# Patient Record
Sex: FEMALE | URBAN - METROPOLITAN AREA
[De-identification: names, ages, dates, MRNs, and addresses within clinical notes are randomized per-mention and may not be internally consistent; named-entity substitution may affect disease eponyms.]

---

## 2024-03-11 ENCOUNTER — OFFICE VISIT (OUTPATIENT)
Dept: FAMILY MEDICINE CLINIC | Facility: CLINIC | Age: 39
End: 2024-03-11

## 2024-03-11 VITALS
TEMPERATURE: 98.3 F | HEART RATE: 89 BPM | WEIGHT: 293 LBS | BODY MASS INDEX: 45.99 KG/M2 | OXYGEN SATURATION: 100 % | RESPIRATION RATE: 18 BRPM | HEIGHT: 67 IN | SYSTOLIC BLOOD PRESSURE: 138 MMHG | DIASTOLIC BLOOD PRESSURE: 86 MMHG

## 2024-03-11 DIAGNOSIS — M51.36 L4-L5 DISC BULGE: ICD-10-CM

## 2024-03-11 DIAGNOSIS — M54.42 CHRONIC LEFT-SIDED LOW BACK PAIN WITH LEFT-SIDED SCIATICA: ICD-10-CM

## 2024-03-11 DIAGNOSIS — G89.29 CHRONIC LEFT-SIDED LOW BACK PAIN WITH LEFT-SIDED SCIATICA: ICD-10-CM

## 2024-03-11 DIAGNOSIS — Z00.00 ENCOUNTER FOR WELL ADULT EXAM WITHOUT ABNORMAL FINDINGS: Primary | ICD-10-CM

## 2024-03-11 DIAGNOSIS — R03.0 ELEVATED BLOOD-PRESSURE READING WITHOUT DIAGNOSIS OF HYPERTENSION: ICD-10-CM

## 2024-03-11 DIAGNOSIS — E66.01 CLASS 3 SEVERE OBESITY DUE TO EXCESS CALORIES WITHOUT SERIOUS COMORBIDITY WITH BODY MASS INDEX (BMI) OF 50.0 TO 59.9 IN ADULT (HCC): ICD-10-CM

## 2024-03-11 DIAGNOSIS — Z12.31 ENCOUNTER FOR SCREENING MAMMOGRAM FOR BREAST CANCER: ICD-10-CM

## 2024-03-11 PROBLEM — E66.813 CLASS 3 SEVERE OBESITY DUE TO EXCESS CALORIES WITHOUT SERIOUS COMORBIDITY WITH BODY MASS INDEX (BMI) OF 50.0 TO 59.9 IN ADULT: Status: ACTIVE | Noted: 2024-03-11

## 2024-03-11 PROBLEM — M51.369 L4-L5 DISC BULGE: Status: ACTIVE | Noted: 2024-03-11

## 2024-03-11 RX ORDER — NAPROXEN 500 MG/1
500 TABLET ORAL AS NEEDED
COMMUNITY

## 2024-03-11 RX ORDER — PREGABALIN 75 MG/1
75 CAPSULE ORAL 2 TIMES DAILY
Qty: 60 CAPSULE | Refills: 3 | Status: SHIPPED | OUTPATIENT
Start: 2024-03-11 | End: 2024-07-09

## 2024-03-11 RX ORDER — ACETAMINOPHEN 500 MG
1000 TABLET ORAL 2 TIMES DAILY
Qty: 120 TABLET | Refills: 0 | Status: SHIPPED | OUTPATIENT
Start: 2024-03-11

## 2024-03-11 SDOH — ECONOMIC STABILITY: HOUSING INSECURITY
IN THE LAST 12 MONTHS, WAS THERE A TIME WHEN YOU DID NOT HAVE A STEADY PLACE TO SLEEP OR SLEPT IN A SHELTER (INCLUDING NOW)?: NO

## 2024-03-11 SDOH — ECONOMIC STABILITY: FOOD INSECURITY: WITHIN THE PAST 12 MONTHS, THE FOOD YOU BOUGHT JUST DIDN'T LAST AND YOU DIDN'T HAVE MONEY TO GET MORE.: NEVER TRUE

## 2024-03-11 SDOH — ECONOMIC STABILITY: FOOD INSECURITY: WITHIN THE PAST 12 MONTHS, YOU WORRIED THAT YOUR FOOD WOULD RUN OUT BEFORE YOU GOT MONEY TO BUY MORE.: NEVER TRUE

## 2024-03-11 SDOH — ECONOMIC STABILITY: INCOME INSECURITY: HOW HARD IS IT FOR YOU TO PAY FOR THE VERY BASICS LIKE FOOD, HOUSING, MEDICAL CARE, AND HEATING?: NOT HARD AT ALL

## 2024-03-11 ASSESSMENT — ENCOUNTER SYMPTOMS
DIARRHEA: 0
ABDOMINAL PAIN: 0
RHINORRHEA: 0
CONSTIPATION: 0
SHORTNESS OF BREATH: 0
EYE PAIN: 0
BACK PAIN: 0
VOMITING: 0
CHEST TIGHTNESS: 0
SORE THROAT: 0
COUGH: 0
NAUSEA: 0

## 2024-03-11 ASSESSMENT — PATIENT HEALTH QUESTIONNAIRE - PHQ9
1. LITTLE INTEREST OR PLEASURE IN DOING THINGS: 0
SUM OF ALL RESPONSES TO PHQ QUESTIONS 1-9: 0
SUM OF ALL RESPONSES TO PHQ9 QUESTIONS 1 & 2: 0
SUM OF ALL RESPONSES TO PHQ QUESTIONS 1-9: 0
2. FEELING DOWN, DEPRESSED OR HOPELESS: 0
SUM OF ALL RESPONSES TO PHQ QUESTIONS 1-9: 0
SUM OF ALL RESPONSES TO PHQ QUESTIONS 1-9: 0

## 2024-03-11 NOTE — PROGRESS NOTES
Bhavik Kumar is a 38 y.o. female (: 1985) presenting to address:    Chief Complaint   Patient presents with    New Patient     C/o back pain       Vitals:    24 1302   BP: 138/86   Pulse: 89   Resp: 18   Temp: 98.3 °F (36.8 °C)   SpO2: 100%       \"Have you been to the ER, urgent care clinic since your last visit?  Hospitalized since your last visit?\"    YES - When: approximately 3 months ago.  Where and Why: [facility] ER for back pain.    “Have you seen or consulted any other health care providers outside of [facility] since your last visit?”    NO        “Have you had a pap smear?”    YES - Where: [facility]  Nurse/CMA to request most recent records if not in the chart          
on blood pressure medicines per patient.  Normal today.  Continue to monitor.    #HM -mammogram ordered.  Pap smear up-to-date.    Plan of care has been discussed, patient agrees with plan; all questions answered.   More than 50% of the time spent in this visit was counseling the patient about  illness and treatment options       Follow up in 1 month for lyrica/ back pain.      Maurice Juarez, DO  Family Medicine  [facility]     PLEASE NOTE:   This document has been produced using voice recognition software. Unrecognized errors in transcription may be present.

## 2024-03-14 ENCOUNTER — NURSE ONLY (OUTPATIENT)
Dept: FAMILY MEDICINE CLINIC | Facility: CLINIC | Age: 39
End: 2024-03-14

## 2024-03-14 DIAGNOSIS — Z00.00 ENCOUNTER FOR WELL ADULT EXAM WITHOUT ABNORMAL FINDINGS: ICD-10-CM

## 2024-03-14 DIAGNOSIS — E66.01 CLASS 3 SEVERE OBESITY DUE TO EXCESS CALORIES WITHOUT SERIOUS COMORBIDITY WITH BODY MASS INDEX (BMI) OF 50.0 TO 59.9 IN ADULT (HCC): ICD-10-CM

## 2024-03-15 DIAGNOSIS — D50.0 IRON DEFICIENCY ANEMIA DUE TO CHRONIC BLOOD LOSS: Primary | ICD-10-CM

## 2024-03-15 LAB
A/G RATIO: 1.3 RATIO (ref 1.1–2.6)
ALBUMIN SERPL-MCNC: 4 G/DL (ref 3.5–5)
ALP BLD-CCNC: 83 U/L (ref 25–115)
ALT SERPL-CCNC: 14 U/L (ref 5–40)
ANION GAP SERPL CALCULATED.3IONS-SCNC: 7 MMOL/L (ref 3–15)
AST SERPL-CCNC: 13 U/L (ref 10–37)
BASOPHILS # BLD: 1 % (ref 0–2)
BASOPHILS ABSOLUTE: 0.1 K/UL (ref 0–0.2)
BILIRUB SERPL-MCNC: 0.3 MG/DL (ref 0.2–1.2)
BUN BLDV-MCNC: 13 MG/DL (ref 6–22)
CALCIUM SERPL-MCNC: 8.9 MG/DL (ref 8.4–10.5)
CHLORIDE BLD-SCNC: 106 MMOL/L (ref 98–110)
CHOLESTEROL/HDL RATIO: 3.4 (ref 0–5)
CHOLESTEROL: 193 MG/DL (ref 110–200)
CO2: 26 MMOL/L (ref 20–32)
CREAT SERPL-MCNC: 0.6 MG/DL (ref 0.5–1.2)
EOSINOPHIL # BLD: 2 % (ref 0–6)
EOSINOPHILS ABSOLUTE: 0.2 K/UL (ref 0–0.5)
ESTIMATED AVERAGE GLUCOSE: 121 MG/DL (ref 91–123)
GLOBULIN: 3 G/DL (ref 2–4)
GLOMERULAR FILTRATION RATE: >60 ML/MIN/1.73 SQ.M.
GLUCOSE: 117 MG/DL (ref 70–99)
HBA1C MFR BLD: 5.8 % (ref 4.8–5.6)
HCT VFR BLD CALC: 32.4 % (ref 35.1–46.5)
HDLC SERPL-MCNC: 57 MG/DL
HEMOGLOBIN: 9.5 G/DL (ref 11.7–15.5)
LDL CHOLESTEROL CALCULATED: 116 MG/DL (ref 50–99)
LDL/HDL RATIO: 2
LYMPHOCYTES # BLD: 35 % (ref 20–45)
LYMPHOCYTES ABSOLUTE: 3.8 K/UL (ref 1–4.8)
MCH RBC QN AUTO: 23 PG (ref 26–34)
MCHC RBC AUTO-ENTMCNC: 29 G/DL (ref 31–36)
MCV RBC AUTO: 78 FL (ref 80–99)
MONOCYTES ABSOLUTE: 0.6 K/UL (ref 0.1–1)
MONOCYTES: 6 % (ref 3–12)
NEUTROPHILS ABSOLUTE: 6 K/UL (ref 1.8–7.7)
NEUTROPHILS: 56 % (ref 40–75)
NON-HDL CHOLESTEROL: 136 MG/DL
PDW BLD-RTO: 18 % (ref 10–15.5)
PLATELET # BLD: 406 K/UL (ref 140–440)
PMV BLD AUTO: 11.1 FL (ref 9–13)
POTASSIUM SERPL-SCNC: 4.4 MMOL/L (ref 3.5–5.5)
RBC: 4.17 M/UL (ref 3.8–5.2)
SODIUM BLD-SCNC: 139 MMOL/L (ref 133–145)
TOTAL PROTEIN: 7 G/DL (ref 6.4–8.3)
TRIGL SERPL-MCNC: 100 MG/DL (ref 40–149)
VLDLC SERPL CALC-MCNC: 20 MG/DL (ref 8–30)
WBC: 10.8 K/UL (ref 4–11)

## 2024-03-15 RX ORDER — FERROUS SULFATE 325(65) MG
325 TABLET ORAL
Qty: 90 TABLET | Refills: 1 | Status: SHIPPED | OUTPATIENT
Start: 2024-03-15

## 2024-04-09 ASSESSMENT — ENCOUNTER SYMPTOMS
SORE THROAT: 0
ABDOMINAL PAIN: 0
EYE PAIN: 0
COUGH: 0
RHINORRHEA: 0
SHORTNESS OF BREATH: 0
CONSTIPATION: 0
BACK PAIN: 0
NAUSEA: 0
VOMITING: 0
DIARRHEA: 0
CHEST TIGHTNESS: 0

## 2024-04-09 NOTE — PROGRESS NOTES
[facility]      MR#: [id number]    HISTORY OF PRESENT ILLNESS  Bhavik Kumar  is a 38 y.o.  female who presents for back pain follow-up.  Had MRI completed.  Has not started physical therapy yet.  Pain clinic was unable to help her, needs a new referral.  Started Lyrica which has been helpful but causes her to be too sleepy and sedated, trying a different medication.    Periods are heavy, previously told she had PCOS, previously was on OCPs but she had more frequent bleeding and so he stopped it.  Has 1 child and is not trying to become pregnant again.  Interested in other options to control her      Past medical history:  Hypertension?  Chronic lower back pain with sciatica  Iron deficiency anemia  Heavy menstrual cycle    Social:  Tobacco use: Never  Alcohol use:  Denies  Illicit drug use: Denies  Housing: Lives in [place]  Employment: [employer]    Health maintenance:  Colon cancer screening: At 43  Breast cancer screening: Due, ordered  Cervical cancer screening: NILM in 2023  COVID: Due   Influenza: Due   PCV: At 63  Shingles: At 48      No family history on file.    No Known Allergies    Social History     Tobacco Use   Smoking Status Never   Smokeless Tobacco Never       Social History     Substance and Sexual Activity   Alcohol Use Not Currently         There is no immunization history on file for this patient.      Current Outpatient Medications:     pregabalin (LYRICA) 50 MG capsule, Take 1 capsule by mouth 2 times daily for 120 days. Max Daily Amount: 100 mg, Disp: 60 capsule, Rfl: 3    naproxen (NAPROSYN) 500 MG tablet, Take 1 tablet by mouth 2 times daily (with meals), Disp: 60 tablet, Rfl: 3    tiZANidine (ZANAFLEX) 2 MG tablet, Take 1 tablet by mouth every 8 hours as needed (muscle spasm, back pain), Disp: 30 tablet, Rfl: 0    ferrous sulfate (IRON 325) 325 (65 Fe) MG tablet, Take 1 tablet by mouth daily (with breakfast), Disp: 90 tablet, Rfl: 1    naproxen (NAPROSYN) 500

## 2024-04-10 ENCOUNTER — NURSE ONLY (OUTPATIENT)
Dept: FAMILY MEDICINE CLINIC | Facility: CLINIC | Age: 39
End: 2024-04-10

## 2024-04-10 ENCOUNTER — OFFICE VISIT (OUTPATIENT)
Dept: FAMILY MEDICINE CLINIC | Facility: CLINIC | Age: 39
End: 2024-04-10
Payer: MEDICAID

## 2024-04-10 VITALS
BODY MASS INDEX: 45.99 KG/M2 | SYSTOLIC BLOOD PRESSURE: 132 MMHG | DIASTOLIC BLOOD PRESSURE: 80 MMHG | HEART RATE: 84 BPM | OXYGEN SATURATION: 98 % | RESPIRATION RATE: 18 BRPM | WEIGHT: 293 LBS | TEMPERATURE: 98.2 F | HEIGHT: 67 IN

## 2024-04-10 DIAGNOSIS — D50.0 IRON DEFICIENCY ANEMIA DUE TO CHRONIC BLOOD LOSS: ICD-10-CM

## 2024-04-10 DIAGNOSIS — G89.29 CHRONIC LEFT-SIDED LOW BACK PAIN WITH LEFT-SIDED SCIATICA: Primary | ICD-10-CM

## 2024-04-10 DIAGNOSIS — N92.0 MENORRHAGIA WITH REGULAR CYCLE: ICD-10-CM

## 2024-04-10 DIAGNOSIS — M54.42 CHRONIC LEFT-SIDED LOW BACK PAIN WITH LEFT-SIDED SCIATICA: Primary | ICD-10-CM

## 2024-04-10 DIAGNOSIS — M51.36 L4-L5 DISC BULGE: ICD-10-CM

## 2024-04-10 PROCEDURE — 99214 OFFICE O/P EST MOD 30 MIN: CPT | Performed by: STUDENT IN AN ORGANIZED HEALTH CARE EDUCATION/TRAINING PROGRAM

## 2024-04-10 RX ORDER — NAPROXEN 500 MG/1
500 TABLET ORAL 2 TIMES DAILY WITH MEALS
Qty: 60 TABLET | Refills: 3 | Status: SHIPPED | OUTPATIENT
Start: 2024-04-10

## 2024-04-10 RX ORDER — PREGABALIN 50 MG/1
50 CAPSULE ORAL 2 TIMES DAILY
Qty: 60 CAPSULE | Refills: 3 | Status: SHIPPED | OUTPATIENT
Start: 2024-04-10 | End: 2024-08-08

## 2024-04-10 RX ORDER — TIZANIDINE 2 MG/1
2 TABLET ORAL EVERY 8 HOURS PRN
Qty: 30 TABLET | Refills: 0 | Status: SHIPPED | OUTPATIENT
Start: 2024-04-10

## 2024-04-10 NOTE — PROGRESS NOTES
Bhavik Kumar is a 38 y.o. female (: 1985) presenting to address:    Chief Complaint   Patient presents with    Back Pain              Vitals:    04/10/24 1008   BP: 132/80   Pulse: 84   Resp: 18   Temp: 98.2 °F (36.8 °C)   SpO2: 98%       \"Have you been to the ER, urgent care clinic since your last visit?  Hospitalized since your last visit?\"    NO    “Have you seen or consulted any other health care providers outside of [facility] since your last visit?”    NO        “Have you had a pap smear?”    YES - Where: [facility], summer 2023 Nurse/CMA to request most recent records if not in the chart    No cervical cancer screening on file

## 2024-04-11 LAB
BASOPHILS # BLD: 1 % (ref 0–2)
BASOPHILS ABSOLUTE: 0.1 K/UL (ref 0–0.2)
EOSINOPHIL # BLD: 1 % (ref 0–6)
EOSINOPHILS ABSOLUTE: 0.1 K/UL (ref 0–0.5)
FERRITIN: 11 NG/ML (ref 10–291)
HCT VFR BLD CALC: 34.2 % (ref 35.1–46.5)
HEMOGLOBIN: 9.7 G/DL (ref 11.7–15.5)
IRON % SATURATION: ABNORMAL
IRON: <19 MCG/DL (ref 30–160)
LYMPHOCYTES # BLD: 34 % (ref 20–45)
LYMPHOCYTES ABSOLUTE: 3.2 K/UL (ref 1–4.8)
MCH RBC QN AUTO: 23 PG (ref 26–34)
MCHC RBC AUTO-ENTMCNC: 28 G/DL (ref 31–36)
MCV RBC AUTO: 80 FL (ref 80–99)
MONOCYTES ABSOLUTE: 0.6 K/UL (ref 0.1–1)
MONOCYTES: 6 % (ref 3–12)
NEUTROPHILS ABSOLUTE: 5.3 K/UL (ref 1.8–7.7)
NEUTROPHILS: 58 % (ref 40–75)
PDW BLD-RTO: 18.4 % (ref 10–15.5)
PLATELET # BLD: 434 K/UL (ref 140–440)
PMV BLD AUTO: 11.4 FL (ref 9–13)
RBC: 4.27 M/UL (ref 3.8–5.2)
RETIC HEMOGLOBIN: 24.4 PG (ref 28.2–38.2)
RETICULOCYTE ABSOLUTE COUNT: 0.06 M/UL (ref 0.03–0.1)
RETICULOCYTE COUNT: 1.5 % (ref 0.5–2)
TOTAL IRON BINDING CAPACITY: ABNORMAL
UIBC: 375 MCG/DL (ref 110–370)
WBC: 9.3 K/UL (ref 4–11)

## 2024-04-22 DIAGNOSIS — M54.42 CHRONIC LEFT-SIDED LOW BACK PAIN WITH LEFT-SIDED SCIATICA: ICD-10-CM

## 2024-04-22 DIAGNOSIS — G89.29 CHRONIC LEFT-SIDED LOW BACK PAIN WITH LEFT-SIDED SCIATICA: ICD-10-CM

## 2024-04-22 DIAGNOSIS — M51.36 L4-L5 DISC BULGE: ICD-10-CM

## 2024-04-22 RX ORDER — TIZANIDINE 2 MG/1
2 TABLET ORAL EVERY 8 HOURS PRN
Qty: 30 TABLET | Refills: 3 | Status: SHIPPED | OUTPATIENT
Start: 2024-04-22

## 2024-04-22 NOTE — TELEPHONE ENCOUNTER
Last visit: 4/10/24  Next visit:   Future Appointments   Date Time Provider Department Center   6/10/2024 10:45 AM Maurice Juarez DO [facility]     Last filled: 4/10/24; tizanidine 2 mg tab; qty 30 w/no refill

## 2024-06-10 ENCOUNTER — OFFICE VISIT (OUTPATIENT)
Dept: FAMILY MEDICINE CLINIC | Facility: CLINIC | Age: 39
End: 2024-06-10
Payer: MEDICAID

## 2024-06-10 VITALS
TEMPERATURE: 97.8 F | RESPIRATION RATE: 16 BRPM | WEIGHT: 293 LBS | HEIGHT: 67 IN | OXYGEN SATURATION: 96 % | HEART RATE: 96 BPM | SYSTOLIC BLOOD PRESSURE: 132 MMHG | BODY MASS INDEX: 45.99 KG/M2 | DIASTOLIC BLOOD PRESSURE: 80 MMHG

## 2024-06-10 DIAGNOSIS — M51.36 L4-L5 DISC BULGE: ICD-10-CM

## 2024-06-10 DIAGNOSIS — M54.42 CHRONIC LEFT-SIDED LOW BACK PAIN WITH LEFT-SIDED SCIATICA: Primary | ICD-10-CM

## 2024-06-10 DIAGNOSIS — G89.29 CHRONIC LEFT-SIDED LOW BACK PAIN WITH LEFT-SIDED SCIATICA: Primary | ICD-10-CM

## 2024-06-10 DIAGNOSIS — D50.0 IRON DEFICIENCY ANEMIA DUE TO CHRONIC BLOOD LOSS: ICD-10-CM

## 2024-06-10 DIAGNOSIS — R06.83 SNORING: ICD-10-CM

## 2024-06-10 PROCEDURE — 99214 OFFICE O/P EST MOD 30 MIN: CPT | Performed by: STUDENT IN AN ORGANIZED HEALTH CARE EDUCATION/TRAINING PROGRAM

## 2024-06-10 ASSESSMENT — ENCOUNTER SYMPTOMS
CONSTIPATION: 0
SORE THROAT: 0
BACK PAIN: 1
EYE PAIN: 0
DIARRHEA: 0
COUGH: 0
NAUSEA: 0
ABDOMINAL PAIN: 0
SHORTNESS OF BREATH: 1
CHEST TIGHTNESS: 0
VOMITING: 0
RHINORRHEA: 0

## 2024-06-10 NOTE — PROGRESS NOTES
[facility]      MR#: [id number]    HISTORY OF PRESENT ILLNESS  History of Present Illness  The patient is a 38-year-old female who presents for back pain follow-up. She is accompanied by her daughter.    The patient reports overall good health, however, she has not yet initiated therapy due to a lack of communication. She has attempted to contact the pain management team, but was unsuccessful due to the lack of the correct contact number. Despite attempts to contact physical therapy, she has not received any communication from them. Her pain management regimen includes naproxen, Lyrica, and Zanaflex, the latter of which she reports as ineffective. The Lyrica dosage was reduced, but Zanaflex was ineffective in managing her pain. Naproxen provides some relief, allowing her to function more effectively.    The patient has not received any communication from her OB/GYN regarding her menstrual bleeding.    The patient is uncertain about the efficacy of her iron medication for her anemia, as she has been experiencing respiratory difficulties over the past weekend. She reports feeling as though her lungs are not expanding sufficiently. She has not previously undergone iron infusions.      Past medical history:  Hypertension?  Chronic lower back pain with sciatica  Iron deficiency anemia  Heavy menstrual cycle     Social:  Tobacco use: Never  Alcohol use:  Denies  Illicit drug use: Denies  Housing: Lives in [place]  Employment: [employer]     Health maintenance:  Colon cancer screening: At 43  Breast cancer screening: Due, ordered  Cervical cancer screening: NILM in 2023  COVID: Due   Influenza: Due   PCV: At 63  Shingles: At 48      Current Outpatient Medications:     iron sucrose (VENOFER) 20 MG/ML injection, Infuse 10 mLs intravenously once a week for 4 doses, Disp: 40 mL, Rfl: 0    ferrous sulfate (IRON 325) 325 (65 Fe) MG tablet, Take 1 tablet by mouth daily (with breakfast), Disp: 90 tablet,

## 2024-06-10 NOTE — PROGRESS NOTES
Bhavik Kumar is a 38 y.o. female (: 1985) presenting to address:    Chief Complaint   Patient presents with    Back Pain       Vitals:    06/10/24 1052   BP: 132/80   Pulse: 96   Resp: 16   Temp: 97.8 °F (36.6 °C)   SpO2: 96%       \"Have you been to the ER, urgent care clinic since your last visit?  Hospitalized since your last visit?\"    NO    “Have you seen or consulted any other [facility]?”    NO       Have you had a mammogram?”   NO    No breast cancer screening on file      “Have you had a pap smear?”    YES - Where: [facility]  Nurse/CMA to request most recent records if not in the chart    No cervical cancer screening on file

## 2024-06-10 NOTE — PATIENT INSTRUCTIONS
[facility] - OBGYN   Phone: [phone]    Dr. Mary Calzada - Pain   Phone: [phone]    [facility]  [phone]

## 2024-06-26 ENCOUNTER — CLINICAL DOCUMENTATION (OUTPATIENT)
Age: 39
End: 2024-06-26

## 2024-07-19 ENCOUNTER — TELEPHONE (OUTPATIENT)
Dept: FAMILY MEDICINE CLINIC | Facility: CLINIC | Age: 39
End: 2024-07-19

## 2024-07-19 NOTE — TELEPHONE ENCOUNTER
Informed pt, referra, OV notes, insurance information and MRI were re-faxed to pain management; contact information was updated in pt's chart, the previous mobile number was no longer in service; pt voiced understanding and will call pain management on Monday to schedule.     To PCP please advise

## 2024-07-19 NOTE — TELEPHONE ENCOUNTER
Pt called stating pain management never received her referral back from her June visit. She would like a call when the referral has been sent.

## 2024-07-22 ENCOUNTER — TELEPHONE (OUTPATIENT)
Facility: HOSPITAL | Age: 39
End: 2024-07-22

## 2024-07-24 ENCOUNTER — HOSPITAL ENCOUNTER (OUTPATIENT)
Facility: HOSPITAL | Age: 39
Setting detail: RECURRING SERIES
Discharge: HOME | End: 2024-07-27
Payer: MEDICAID

## 2024-07-24 PROCEDURE — 97163 PT EVAL HIGH COMPLEX 45 MIN: CPT

## 2024-07-24 NOTE — THERAPY EVALUATION
50% limited with pain, SB R not limited L 75% limited with ache, Rot not limited bilat.   Strength: Hip Flexion R 3+/5 L 3+/5, ABD R 3/5 L 3/5, Extension R 2+/5 L 2+/5  Directional Preference: Prone lying 2min with more throbbing in the L hip, ADRIÁN 2 min increased pain in low back to 8-9/10, prone lying again for 2 min  Current deficits include decreased core and hip stability with increased tissue tightness, poor postures, poor gait, and decreased endurance all contributing to LBP and ability to complete ADLs.  Pt will benefit from a comprehensive POC/HEP to address impairments and restore function in order to return to prior level of function and prevent secondary impairments.  Not post operative    Evaluation Complexity:  History:  HIGH Complexity :3+ comorbidities / personal factors will impact the outcome/ POC ; Examination:  HIGH Complexity : 4+ Standardized tests and measures addressing body structure, function, activity limitation and / or participation in recreation  ;Presentation:  HIGH Complexity : Unstable and unpredictable characteristics  ;Clinical Decision Making: Lower Extremity Functional Scale (LEFS) = 25 ; (< 40 Moderate to Severe Dysfunction) = HIGH Complexity  Overall Complexity Rating: HIGH   Problem List: pain affecting function, decrease ROM, decrease strength, impaired gait/balance, decrease ADL/functional abilities, decrease activity tolerance, decrease flexibility/joint mobility, and decrease transfer abilities    Treatment Plan may include any combination of the followin Therapeutic Exercise, 23403 Neuromuscular Re-Education, 68695 Manual Therapy, 96333 Therapeutic Activity, 24348 Self Care/Home Management, 68289 Electrical Stim unattended, 15175 Electrical Stim attended, 35785 Aquatic Therapy, 82527 Gait Training, and 03925 Ultrasound  Patient / Family readiness to learn indicated by: asking questions, trying to perform skills, interest, return verbalization , and return

## 2024-07-24 NOTE — PROGRESS NOTES
PHYSICAL / OCCUPATIONAL THERAPY - DAILY TREATMENT NOTE (updated )  For Eval visit    Patient Name: Bhavik Kumar    Date: 2024    : 1985  Insurance: Payor: [facility] MEDICAID / Plan: [facility] CARDINAL CARE / Product Type: *No Product type* /      Patient  verified yes     Visit #   Current / Total 1 16   Time   In / Out 11:05 11:40   Pain   In / Out 7 7   Subjective Functional Status/Changes: See POC     TREATMENT AREA =  Lumbago with sciatica, left side [M54.42]  Chronic low back pain [M54.50, G89.29]    OBJECTIVE           35 min   Eval - untimed                      Therapeutic Procedures:  Tx Min Billable or 1:1 Min (if diff from Tx Min) Procedure, Rationale, Specifics     84251 Therapeutic Exercise (timed):  increase ROM, strength, coordination, balance, and proprioception to improve patient's ability to progress to PLOF and address remaining functional goals. (see flow sheet as applicable)     Details if applicable:       00054 Neuromuscular Re-Education (timed):  improve balance, coordination, kinesthetic sense, posture, core stability and proprioception to improve patient's ability to develop conscious control of individual muscles and awareness of position of extremities in order to progress to PLOF and address remaining functional goals. (see flow sheet as applicable)     Details if applicable:       50052 Manual Therapy (timed):  decrease pain, increase ROM, increase tissue extensibility, and decrease trigger points to improve patient's ability to progress to PLOF and address remaining functional goals.  The manual therapy interventions were performed at a separate and distinct time from the therapeutic activities interventions . (see flow sheet as applicable)     Details if applicable:       93770 Self Care/Home Management (timed):  improve patient knowledge and understanding of pain reducing techniques, positioning, posture/ergonomics, home safety, activity

## 2024-08-13 ENCOUNTER — HOSPITAL ENCOUNTER (OUTPATIENT)
Facility: HOSPITAL | Age: 39
Setting detail: RECURRING SERIES
Discharge: HOME | End: 2024-08-16
Payer: MEDICAID

## 2024-08-13 PROCEDURE — 97110 THERAPEUTIC EXERCISES: CPT

## 2024-08-13 PROCEDURE — 97112 NEUROMUSCULAR REEDUCATION: CPT

## 2024-08-13 PROCEDURE — 97535 SELF CARE MNGMENT TRAINING: CPT

## 2024-08-13 NOTE — PROGRESS NOTES
PHYSICAL / OCCUPATIONAL THERAPY - DAILY TREATMENT NOTE (updated )    Patient Name: Bhavik Kumar    Date: 2024    : 1985  Insurance: Payor: [facility] MEDICAID / Plan: [facility]AN CARDINAL CARE / Product Type: *No Product type* /      Patient  verified yes     Visit #   Current / Total 2 16   Time   In / Out 2:20 2:55   Pain   In / Out 5/10 5/10   Subjective Functional Status/Changes: Says no changes since IE. Has the pain on the L side of the glute and hip to mid thigh today. Reports pain was worse last week she thinks due to the weather.     TREATMENT AREA =  Lumbago with sciatica, left side [M54.42]  Chronic low back pain [M54.50, G89.29]    OBJECTIVE      Therapeutic Procedures:  Tx Min Billable or 1:1 Min (if diff from Tx Min) Procedure, Rationale, Specifics   17  87281 Therapeutic Exercise (timed):  increase ROM, strength, coordination, balance, and proprioception to improve patient's ability to progress to PLOF and address remaining functional goals. (see flow sheet as applicable)     Details if applicable:     10  82841 Neuromuscular Re-Education (timed):  improve balance, coordination, kinesthetic sense, posture, core stability and proprioception to improve patient's ability to develop conscious control of individual muscles and awareness of position of extremities in order to progress to PLOF and address remaining functional goals. (see flow sheet as applicable)     Details if applicable:       07466 Manual Therapy (timed):  decrease pain, increase ROM, increase tissue extensibility, and decrease trigger points to improve patient's ability to progress to PLOF and address remaining functional goals.  The manual therapy interventions were performed at a separate and distinct time from the therapeutic activities interventions . (see flow sheet as applicable)     Details if applicable:     8  19753 Self Care/Home Management (timed):  improve patient knowledge and understanding of

## 2024-08-15 ENCOUNTER — HOSPITAL ENCOUNTER (OUTPATIENT)
Facility: HOSPITAL | Age: 39
Setting detail: RECURRING SERIES
Discharge: HOME | End: 2024-08-18
Payer: MEDICAID

## 2024-08-15 PROCEDURE — 97530 THERAPEUTIC ACTIVITIES: CPT

## 2024-08-15 PROCEDURE — 97110 THERAPEUTIC EXERCISES: CPT

## 2024-08-15 PROCEDURE — 97112 NEUROMUSCULAR REEDUCATION: CPT

## 2024-08-15 NOTE — PROGRESS NOTES
PHYSICAL / OCCUPATIONAL THERAPY - DAILY TREATMENT NOTE (updated )    Patient Name: Bhavik Kumar    Date: 8/15/2024    : 1985  Insurance: Payor: [facility] MEDICAID / Plan: [facility] CARDINAL CARE / Product Type: *No Product type* /      Patient  verified yes     Visit #   Current / Total 3 16   Time   In / Out 2:22 3:00   Pain   In / Out 8/10 8/10   Subjective Functional Status/Changes: In a lot of pain today she says. Just normal for her she says. Pain along the L hip and thigh. Was sore in the thighs after last visits which was okay, but also says the pain got worse before she went to bed that night too.    Went to PM yesterday who said the pain is coming from the low back. Gave her an injection in the L hip for bursitis which the pt says did help.      TREATMENT AREA =  Lumbago with sciatica, left side [M54.42]  Chronic low back pain [M54.50, G89.29]    OBJECTIVE  Modalities Rationale:     decrease pain to improve patient's ability to progress to PLOF and address remaining functional goals.     min [] Estim Unattended, type/location:                                      []  w/ice    []  w/heat    min [] Estim Attended, type/location:                                     []  w/US     []  w/ice    []  w/heat    []  TENS insruct      min []  Mechanical Traction: type/lbs                   []  pro   []  sup   []  int   []  cont    []  before manual    []  after manual    min []  Ultrasound, settings/location:     10 min  unbill [x]  Ice     []  Heat    location/position: L hip in R sidelying    min []  Paraffin,  details:     min []  Vasopneumatic Device, press/temp:     min []  Whirlpool / Fluido:    If using vaso (only need to measure limb vaso being performed on)      pre-treatment girth :       post-treatment girth :       measured at (landmark location) :      min []  Other:    Skin assessment post-treatment:   Intact       Therapeutic Procedures:  Tx Min Billable or 1:1 Min (if diff

## 2024-08-19 ENCOUNTER — HOSPITAL ENCOUNTER (OUTPATIENT)
Facility: HOSPITAL | Age: 39
Setting detail: RECURRING SERIES
Discharge: HOME | End: 2024-08-22
Payer: MEDICAID

## 2024-08-19 PROCEDURE — 97112 NEUROMUSCULAR REEDUCATION: CPT

## 2024-08-19 PROCEDURE — 97530 THERAPEUTIC ACTIVITIES: CPT

## 2024-08-19 PROCEDURE — 97110 THERAPEUTIC EXERCISES: CPT

## 2024-08-19 NOTE — PROGRESS NOTES
PHYSICAL / OCCUPATIONAL THERAPY - DAILY TREATMENT NOTE (updated )    Patient Name: Bhavik Kumar    Date: 2024    : 1985  Insurance: Payor: [facility] MEDICAID / Plan: [facility] CARDINAL CARE / Product Type: *No Product type* /      Patient  verified yes     Visit #   Current / Total 4 16   Time   In / Out 223  PM   Pain   In / Out 5/10 7/10   Subjective Functional Status/Changes: Pt reports having less pain than last visit. States she tries to door dash at least 3-4x/week and that's what really sets it off, especially when getting in and out of the car. Patient denies falls or red flags since last visit.       TREATMENT AREA =  Lumbago with sciatica, left side [M54.42]  Chronic low back pain [M54.50, G89.29]    OBJECTIVE  Modalities Rationale:     decrease pain to improve patient's ability to progress to PLOF and address remaining functional goals.     min [] Estim Unattended, type/location:                                      []  w/ice    []  w/heat    min [] Estim Attended, type/location:                                     []  w/US     []  w/ice    []  w/heat    []  TENS insruct      min []  Mechanical Traction: type/lbs                   []  pro   []  sup   []  int   []  cont    []  before manual    []  after manual    min []  Ultrasound, settings/location:     10 min  unbill [x]  Ice     []  Heat    location/position: L hip in R sidelying    min []  Paraffin,  details:     min []  Vasopneumatic Device, press/temp:     min []  Whirlpool / Fluido:    If using vaso (only need to measure limb vaso being performed on)      pre-treatment girth :       post-treatment girth :       measured at (landmark location) :      min []  Other:    Skin assessment post-treatment:   Intact       Therapeutic Procedures:  Tx Min Billable or 1:1 Min (if diff from Tx Min) Procedure, Rationale, Specifics   10  60305 Therapeutic Exercise (timed):  increase ROM, strength, coordination, balance, and

## 2024-08-21 ENCOUNTER — APPOINTMENT (OUTPATIENT)
Facility: HOSPITAL | Age: 39
End: 2024-08-21
Payer: MEDICAID

## 2024-08-26 ENCOUNTER — HOSPITAL ENCOUNTER (OUTPATIENT)
Facility: HOSPITAL | Age: 39
Setting detail: RECURRING SERIES
Discharge: HOME | End: 2024-08-29
Payer: MEDICAID

## 2024-08-26 PROCEDURE — 97113 AQUATIC THERAPY/EXERCISES: CPT

## 2024-08-26 NOTE — PROGRESS NOTES
PHYSICAL / OCCUPATIONAL THERAPY - DAILY TREATMENT NOTE (updated )    Patient Name: Bhavik Kumar    Date: 2024    : 1985  Insurance: Payor: [facility] MEDICAID / Plan: [facility] CARDINAL CARE / Product Type: *No Product type* /      Patient  verified yes     Visit #   Current / Total 5 16   Time   In / Out 230    Pain   In / Out 5/10 5/10   Subjective Functional Status/Changes: Patient reports everything hurts.    SEE PN     TREATMENT AREA =  Lumbago with sciatica, left side [M54.42]  Chronic low back pain [M54.50, G89.29]    OBJECTIVE  Modalities Rationale:     decrease pain to improve patient's ability to progress to PLOF and address remaining functional goals.     min [] Estim Unattended, type/location:                                      []  w/ice    []  w/heat    min [] Estim Attended, type/location:                                     []  w/US     []  w/ice    []  w/heat    []  TENS insruct      min []  Mechanical Traction: type/lbs                   []  pro   []  sup   []  int   []  cont    []  before manual    []  after manual    min []  Ultrasound, settings/location:      min  unbill [x]  Ice     []  Heat    location/position: L hip in R sidelying    min []  Paraffin,  details:     min []  Vasopneumatic Device, press/temp:     min []  Whirlpool / Fluido:    If using vaso (only need to measure limb vaso being performed on)      pre-treatment girth :       post-treatment girth :       measured at (landmark location) :      min []  Other:    Skin assessment post-treatment:   Intact       Therapeutic Procedures:  Tx Min Billable or 1:1 Min (if diff from Tx Min) Procedure, Rationale, Specifics     11829 Therapeutic Exercise (timed):  increase ROM, strength, coordination, balance, and proprioception to improve patient's ability to progress to PLOF and address remaining functional goals.  (see flow sheet as applicable)     Details if applicable:       78677 Neuromuscular

## 2024-08-28 ENCOUNTER — HOSPITAL ENCOUNTER (OUTPATIENT)
Facility: HOSPITAL | Age: 39
Setting detail: RECURRING SERIES
Discharge: HOME | End: 2024-08-31
Payer: MEDICAID

## 2024-08-28 PROCEDURE — 97113 AQUATIC THERAPY/EXERCISES: CPT

## 2024-08-28 NOTE — PROGRESS NOTES
[facility]  [address], [place] [address] - Ph: [phone]  Fx: [phone]  PHYSICAL THERAPY PROGRESS NOTE  Patient Name: Bhavik Kumar : 1985   Treatment/Medical Diagnosis: Lumbago with sciatica, left side [M54.42]  Chronic low back pain [M54.50, G89.29]   Referral Source: Maurice Juarez DO     Date of Initial Visit: 24 Attended Visits: 5 Missed Visits: 0     SUMMARY OF TREATMENT  Pt was seen for IE and 4 f/u visits with treatment consisting of therapeutic exercise, therapeutic activity, neuromuscular activity, manual therapy, activity modification/progression, and Pt ED to improve functional ROM and endurance along with instruction of HEP.  CURRENT STATUS  Pt has only had 5 visits in a months time since beginning therapy.  She notes no change in any symptoms.  Her max pain levels continue to remain at 10/10 and average pain level of 7/10. Pt report 0% improvement since beginning therapy. Pt notes they are completing their HEP at least once a day. Pt will continue to benefit from skilled PT to progress exercises and improve upon ROM and endurance.  **PN performed during aquatic session    Independent with HEP to progress to meet goals.  Status at last Eval: established  Current Status: reports compliance  Goal Met?  yes    2.  Pt to report decreased max pain levels less than 8/10 for improvement in ADLs   Status at last Eval: 10/10  Current Status: 10/10  Goal Met?  no    3. Pt to report 2+ on GROC to signify improved function and tolerance to activities   Status at last Eval: NT  Current Status: NA  Goal Met?  n/a    Payor: [facility] MEDICAID / Plan: [facility] CARDINAL CARE / Product Type: *No Product type* /     Non-Medicare, can change goals, can adjust or add frequency duration, no signature required      New Goals to be achieved in __4__ WEEKS  Continue with unmet goals above    Frequency /

## 2024-09-04 ENCOUNTER — APPOINTMENT (OUTPATIENT)
Facility: HOSPITAL | Age: 39
End: 2024-09-04
Payer: MEDICAID

## 2024-09-09 ENCOUNTER — HOSPITAL ENCOUNTER (OUTPATIENT)
Facility: HOSPITAL | Age: 39
Setting detail: RECURRING SERIES
Discharge: HOME | End: 2024-09-12
Payer: MEDICAID

## 2024-09-09 PROCEDURE — 97113 AQUATIC THERAPY/EXERCISES: CPT

## 2024-09-11 ENCOUNTER — HOSPITAL ENCOUNTER (OUTPATIENT)
Facility: HOSPITAL | Age: 39
Setting detail: RECURRING SERIES
Discharge: HOME | End: 2024-09-14
Payer: MEDICAID

## 2024-09-11 PROCEDURE — 97113 AQUATIC THERAPY/EXERCISES: CPT

## 2024-09-16 ENCOUNTER — APPOINTMENT (OUTPATIENT)
Facility: HOSPITAL | Age: 39
End: 2024-09-16
Payer: MEDICAID

## 2024-09-16 ENCOUNTER — HOSPITAL ENCOUNTER (OUTPATIENT)
Facility: HOSPITAL | Age: 39
Setting detail: RECURRING SERIES
Discharge: HOME | End: 2024-09-19
Payer: MEDICAID

## 2024-09-16 PROCEDURE — 97110 THERAPEUTIC EXERCISES: CPT

## 2024-09-16 PROCEDURE — 97530 THERAPEUTIC ACTIVITIES: CPT

## 2024-09-16 PROCEDURE — 97112 NEUROMUSCULAR REEDUCATION: CPT

## 2024-09-18 ENCOUNTER — HOSPITAL ENCOUNTER (OUTPATIENT)
Facility: HOSPITAL | Age: 39
Setting detail: RECURRING SERIES
Discharge: HOME | End: 2024-09-21
Payer: MEDICAID

## 2024-09-18 ENCOUNTER — APPOINTMENT (OUTPATIENT)
Facility: HOSPITAL | Age: 39
End: 2024-09-18
Payer: MEDICAID

## 2024-09-18 PROCEDURE — 97110 THERAPEUTIC EXERCISES: CPT

## 2024-09-18 PROCEDURE — 97535 SELF CARE MNGMENT TRAINING: CPT

## 2024-09-18 PROCEDURE — 97530 THERAPEUTIC ACTIVITIES: CPT

## 2024-09-18 PROCEDURE — 97112 NEUROMUSCULAR REEDUCATION: CPT

## 2024-09-23 ENCOUNTER — HOSPITAL ENCOUNTER (OUTPATIENT)
Facility: HOSPITAL | Age: 39
Setting detail: RECURRING SERIES
Discharge: HOME | End: 2024-09-26
Payer: MEDICAID

## 2024-09-23 PROCEDURE — 97113 AQUATIC THERAPY/EXERCISES: CPT

## 2024-09-25 ENCOUNTER — HOSPITAL ENCOUNTER (OUTPATIENT)
Facility: HOSPITAL | Age: 39
Setting detail: RECURRING SERIES
Discharge: HOME | End: 2024-09-28
Payer: MEDICAID

## 2024-09-25 PROCEDURE — 97113 AQUATIC THERAPY/EXERCISES: CPT

## 2024-09-27 DIAGNOSIS — G89.29 CHRONIC LEFT-SIDED LOW BACK PAIN WITH LEFT-SIDED SCIATICA: ICD-10-CM

## 2024-09-27 DIAGNOSIS — M54.42 CHRONIC LEFT-SIDED LOW BACK PAIN WITH LEFT-SIDED SCIATICA: ICD-10-CM

## 2024-09-27 DIAGNOSIS — M51.36 L4-L5 DISC BULGE: ICD-10-CM

## 2024-09-27 RX ORDER — ACETAMINOPHEN 500 MG
1000 TABLET ORAL 2 TIMES DAILY
Qty: 120 TABLET | Refills: 0 | Status: SHIPPED | OUTPATIENT
Start: 2024-09-27

## 2024-10-01 ENCOUNTER — TELEPHONE (OUTPATIENT)
Facility: HOSPITAL | Age: 39
End: 2024-10-01
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
## 2024-10-02 ENCOUNTER — APPOINTMENT (OUTPATIENT)
Facility: HOSPITAL | Age: 39
End: 2024-10-02
Payer: MEDICAID

## 2024-10-07 ENCOUNTER — HOSPITAL ENCOUNTER (OUTPATIENT)
Facility: HOSPITAL | Age: 39
Setting detail: RECURRING SERIES
Discharge: HOME | End: 2024-10-10
Payer: MEDICAID

## 2024-10-07 PROCEDURE — 97113 AQUATIC THERAPY/EXERCISES: CPT

## 2024-10-07 NOTE — PROGRESS NOTES
PHYSICAL / OCCUPATIONAL THERAPY - DAILY TREATMENT NOTE (updated )    Patient Name: Bhavik Kumar    Date: 10/7/2024    : 1985  Insurance: [facility]: [facility] MEDICA[facility] / Plan: [facility] CARDINAL CARE / Product Type: *No Product type* /      Patient  verified yes     Visit #   Current / Total 13 21   Time   In / Out 200 230   Pain   In / Out 6 8/10   Subjective Functional Status/Changes: Reports she has had 8/10 pain at times over the past week.  Says that she was feeling a little better since last assessment, but her symptoms have ramped up again.  Says that she doesn't think it's muscular and believes that something else is going on with her body.       TREATMENT AREA =  Lumbago with sciatica, left side [M54.42]  Chronic low back pain [M54.50, G89.29]    OBJECTIVE  Modalities Rationale:     decrease pain to improve patient's ability to progress to PLOF and address remaining functional goals.     min [] Estim Unattended, type/location:                                      []  w/ice    []  w/heat    min [] Estim Attended, type/location:                                     []  w/US     []  w/ice    []  w/heat    []  TENS insruct      min []  Mechanical Traction: type/lbs                   []  pro   []  sup   []  int   []  cont    []  before manual    []  after manual    min []  Ultrasound, settings/location:      min  unbill [x]  Ice     []  Heat    location/position: L low back - supine LE elevated    min []  Paraffin,  details:     min []  Vasopneumatic Device, press/temp:     min []  Whirlpool / Fluido:    If using vaso (only need to measure limb vaso being performed on)      pre-treatment girth :       post-treatment girth :       measured at (landmark location) :      min []  Other:    Skin assessment post-treatment:   Intact       Therapeutic Procedures:  Tx Min Billable or 1:1 Min (if diff from Tx Min) Procedure, Rationale, Specifics     00042 Therapeutic Exercise (timed):  increase ROM,

## 2024-10-11 ENCOUNTER — HOSPITAL ENCOUNTER (OUTPATIENT)
Facility: HOSPITAL | Age: 39
Setting detail: RECURRING SERIES
Discharge: HOME | End: 2024-10-14
Payer: MEDICAID

## 2024-10-11 PROCEDURE — 97535 SELF CARE MNGMENT TRAINING: CPT

## 2024-10-11 PROCEDURE — 97530 THERAPEUTIC ACTIVITIES: CPT

## 2024-10-11 PROCEDURE — 97112 NEUROMUSCULAR REEDUCATION: CPT

## 2024-10-11 PROCEDURE — 97110 THERAPEUTIC EXERCISES: CPT

## 2024-10-11 NOTE — PROGRESS NOTES
PHYSICAL / OCCUPATIONAL THERAPY - DAILY TREATMENT NOTE (updated )    Patient Name: Bhavik Kumar    Date: 10/11/2024    : 1985  Insurance: Payor:  MEDICAID / Plan: [facility] CARDINAL CARE / Product Type: *No Product type* /      Patient  verified yes     Visit #   Current / Total 14 21   Time   In / Out 1020 1120   Pain   In / Out 4 4-5   Subjective Functional Status/Changes: SEE PN     TREATMENT AREA =  Lumbago with sciatica, left side [M54.42]  Chronic low back pain [M54.50, G89.29]    OBJECTIVE  Modalities Rationale:     decrease pain to improve patient's ability to progress to PLOF and address remaining functional goals.     min [] Estim Unattended, type/location:                                      []  w/ice    []  w/heat    min [] Estim Attended, type/location:                                     []  w/US     []  w/ice    []  w/heat    []  TENS insruct      min []  Mechanical Traction: type/lbs                   []  pro   []  sup   []  int   []  cont    []  before manual    []  after manual    min []  Ultrasound, settings/location:      min  unbill [x]  Ice     []  Heat    location/position: L low back - supine LE elevated    min []  Paraffin,  details:     min []  Vasopneumatic Device, press/temp:     min []  Whirlpool / Fluido:    If using vaso (only need to measure limb vaso being performed on)      pre-treatment girth :       post-treatment girth :       measured at (landmark location) :      min []  Other:    Skin assessment post-treatment:   Intact       Therapeutic Procedures:  Tx Min Billable or 1:1 Min (if diff from Tx Min) Procedure, Rationale, Specifics   15  84675 Therapeutic Exercise (timed):  increase ROM, strength, coordination, balance, and proprioception to improve patient's ability to progress to PLOF and address remaining functional goals.  (see flow sheet as applicable)     Details if applicable:     10  65797 Neuromuscular Re-Education (timed):

## 2024-10-11 NOTE — PROGRESS NOTES
[facility]  [address], [facility] [address], [place] [address] - Ph: [phone]  Fx: [phone]  PHYSICAL THERAPY PROGRESS NOTE  Patient Name: Bhavik Kumar : 1985   Treatment/Medical Diagnosis: Lumbago with sciatica, left side [M54.42]  Chronic low back pain [M54.50, G89.29]   Referral Source: Maurice Juarez DO     Date of Initial Visit: 24 Attended Visits: 14 Missed Visits: 1 CX     SUMMARY OF TREATMENT  Pt was seen for IE and 13 f/u visits with treatment consisting of therapeutic exercise, therapeutic activity, neuromuscular activity, manual therapy, activity modification/progression, and Pt ED to improve functional ROM and endurance along with instruction of HEP.    CURRENT STATUS  Pt is experiencing a regression with progress.  Her max pain levels have reached 8-9/10 (in the last week) and average pain level of 4-6/10. Pt is reporting no change since last assessment, however does note that walking is still a little better.  Patient has multiple outside factors that interfer with her ability to reduce pain symptoms.  She is currently attempting to put together a care team in hopes that she can resolve or help mitigate some of her circumstances. Patient continues to complain of L sided low back pain that randomly manifest into radicular symptoms.  She notes having to take pain meds when it is unbearable and has contemplated going to the emergency room.  She continues to have poor tolerance to typical ADLs with standing being the most troublesome.  Patient has decided that she would like to maintain her in clinic status and forego aquatics. I am in agreement and believe that she would benefit from a more hands on approach.  Patient was given an updated HEP this assessment.  It was carefully reviewed and demonstrations for technique were successful. Due to the all the above, pt would benefit from skilled PT to hopefully gain

## 2024-10-16 ENCOUNTER — HOSPITAL ENCOUNTER (OUTPATIENT)
Facility: HOSPITAL | Age: 39
Setting detail: RECURRING SERIES
Discharge: HOME | End: 2024-10-19
Payer: MEDICAID

## 2024-10-16 PROCEDURE — 97535 SELF CARE MNGMENT TRAINING: CPT

## 2024-10-16 PROCEDURE — 97112 NEUROMUSCULAR REEDUCATION: CPT

## 2024-10-16 PROCEDURE — 97530 THERAPEUTIC ACTIVITIES: CPT

## 2024-10-16 PROCEDURE — 97110 THERAPEUTIC EXERCISES: CPT

## 2024-10-16 NOTE — PROGRESS NOTES
PHYSICAL / OCCUPATIONAL THERAPY - DAILY TREATMENT NOTE (updated )    Patient Name: Bhavik Kumar    Date: 10/16/2024    : 1985  Insurance: Payor: [facility] MEDICAID / Plan: [facility] CARDINAL CARE / Product Type: *No Product type* /      Patient  verified yes     Visit #   Current / Total 15 21   Time   In / Out 1103 1205   Pain   In / Out 4    Subjective Functional Status/Changes: Patient reports she didn't have any increases in sx post last session.      TREATMENT AREA =  Lumbago with sciatica, left side [M54.42]  Chronic low back pain [M54.50, G89.29]    OBJECTIVE  Modalities Rationale:     decrease pain to improve patient's ability to progress to PLOF and address remaining functional goals.     min [] Estim Unattended, type/location:                                      []  w/ice    []  w/heat    min [] Estim Attended, type/location:                                     []  w/US     []  w/ice    []  w/heat    []  TENS insruct      min []  Mechanical Traction: type/lbs                   []  pro   []  sup   []  int   []  cont    []  before manual    []  after manual    min []  Ultrasound, settings/location:      min  unbill [x]  Ice     []  Heat    location/position: L low back - supine LE elevated    min []  Paraffin,  details:     min []  Vasopneumatic Device, press/temp:     min []  Whirlpool / Fluido:    If using vaso (only need to measure limb vaso being performed on)      pre-treatment girth :       post-treatment girth :       measured at (landmark location) :      min []  Other:    Skin assessment post-treatment:   Intact       Therapeutic Procedures:  Tx Min Billable or 1:1 Min (if diff from Tx Min) Procedure, Rationale, Specifics   15  23552 Therapeutic Exercise (timed):  increase ROM, strength, coordination, balance, and proprioception to improve patient's ability to progress to PLOF and address remaining functional goals.  (see flow sheet as applicable)     Details if

## 2024-10-18 ENCOUNTER — APPOINTMENT (OUTPATIENT)
Facility: HOSPITAL | Age: 39
End: 2024-10-18
Payer: MEDICAID

## 2024-10-23 ENCOUNTER — HOSPITAL ENCOUNTER (OUTPATIENT)
Facility: HOSPITAL | Age: 39
Setting detail: RECURRING SERIES
Discharge: HOME | End: 2024-10-26
Payer: MEDICAID

## 2024-10-23 PROCEDURE — 97530 THERAPEUTIC ACTIVITIES: CPT

## 2024-10-23 PROCEDURE — 97110 THERAPEUTIC EXERCISES: CPT

## 2024-10-23 PROCEDURE — 97535 SELF CARE MNGMENT TRAINING: CPT

## 2024-10-23 PROCEDURE — 97112 NEUROMUSCULAR REEDUCATION: CPT

## 2024-10-23 NOTE — PROGRESS NOTES
PHYSICAL / OCCUPATIONAL THERAPY - DAILY TREATMENT NOTE (updated )    Patient Name: Bhavik Kumar    Date: 10/23/2024    : 1985  Insurance: Payor: [facility] MEDICAID / Plan: [facility] [place] [place] / Product Type: *No Product type* /      Patient  verified yes     Visit #   Current / Total 16 21   Time   In / Out 1104 1203   Pain   In / Out 8/10 7-8/10   Subjective Functional Status/Changes: Patient reports she woke up with a lot of pain this morning.  Says that it went to her knee and foot. Says that knee still feels tight. Says she did fine over the weekend.  Says that she slept fine, but that she didn't move all night and that could be why it is hurting so much today.     TREATMENT AREA =  Lumbago with sciatica, left side [M54.42]  Chronic low back pain [M54.50, G89.29]    OBJECTIVE  Modalities Rationale:     decrease pain to improve patient's ability to progress to PLOF and address remaining functional goals.     min [] Estim Unattended, type/location:                                      []  w/ice    []  w/heat    min [] Estim Attended, type/location:                                     []  w/US     []  w/ice    []  w/heat    []  TENS insruct      min []  Mechanical Traction: type/lbs                   []  pro   []  sup   []  int   []  cont    []  before manual    []  after manual    min []  Ultrasound, settings/location:     10 min  unbill [x]  Ice     []  Heat    location/position: L draped over hip and back- s/l    min []  Paraffin,  details:     min []  Vasopneumatic Device, press/temp:     min []  Whirlpool / Fluido:    If using vaso (only need to measure limb vaso being performed on)      pre-treatment girth :       post-treatment girth :       measured at (landmark location) :      min []  Other:    Skin assessment post-treatment:   Intact       Therapeutic Procedures:  Tx Min Billable or 1:1 Min (if diff from Tx Min) Procedure, Rationale, Specifics   8  62735 Therapeutic

## 2024-10-25 ENCOUNTER — OFFICE VISIT (OUTPATIENT)
Dept: FAMILY MEDICINE CLINIC | Facility: CLINIC | Age: 39
End: 2024-10-25
Payer: MEDICAID

## 2024-10-25 ENCOUNTER — HOSPITAL ENCOUNTER (OUTPATIENT)
Facility: HOSPITAL | Age: 39
Setting detail: RECURRING SERIES
Discharge: HOME | End: 2024-10-28
Payer: MEDICAID

## 2024-10-25 VITALS
HEIGHT: 67 IN | DIASTOLIC BLOOD PRESSURE: 84 MMHG | BODY MASS INDEX: 45.99 KG/M2 | TEMPERATURE: 97.7 F | WEIGHT: 293 LBS | HEART RATE: 81 BPM | RESPIRATION RATE: 15 BRPM | SYSTOLIC BLOOD PRESSURE: 124 MMHG | OXYGEN SATURATION: 99 %

## 2024-10-25 DIAGNOSIS — R73.03 PREDIABETES: Primary | ICD-10-CM

## 2024-10-25 DIAGNOSIS — E66.01 CLASS 3 SEVERE OBESITY DUE TO EXCESS CALORIES WITHOUT SERIOUS COMORBIDITY WITH BODY MASS INDEX (BMI) OF 50.0 TO 59.9 IN ADULT: ICD-10-CM

## 2024-10-25 DIAGNOSIS — E66.813 CLASS 3 SEVERE OBESITY DUE TO EXCESS CALORIES WITHOUT SERIOUS COMORBIDITY WITH BODY MASS INDEX (BMI) OF 50.0 TO 59.9 IN ADULT: ICD-10-CM

## 2024-10-25 PROCEDURE — 97110 THERAPEUTIC EXERCISES: CPT

## 2024-10-25 PROCEDURE — 99213 OFFICE O/P EST LOW 20 MIN: CPT | Performed by: STUDENT IN AN ORGANIZED HEALTH CARE EDUCATION/TRAINING PROGRAM

## 2024-10-25 PROCEDURE — 97530 THERAPEUTIC ACTIVITIES: CPT

## 2024-10-25 PROCEDURE — 97112 NEUROMUSCULAR REEDUCATION: CPT

## 2024-10-25 ASSESSMENT — ENCOUNTER SYMPTOMS
VOMITING: 0
ABDOMINAL PAIN: 0
NAUSEA: 0
RHINORRHEA: 0
EYE PAIN: 0
COUGH: 0
BACK PAIN: 1
SORE THROAT: 0
CONSTIPATION: 0
DIARRHEA: 0
SHORTNESS OF BREATH: 0
CHEST TIGHTNESS: 0

## 2024-10-25 NOTE — PROGRESS NOTES
Bhavik Kumar is a 39 y.o. female (: 1985) presenting to address:    Chief Complaint   Patient presents with    Follow-up     Pt wants to be tested for DM       Vitals:    10/25/24 1354   BP: 124/84   Pulse: 81   Resp: 15   Temp: 97.7 °F (36.5 °C)   SpO2: 99%       \"Have you been to the ER, urgent care clinic since your last visit?  Hospitalized since your last visit?\"    YES - When: approximately 7 months ago.  Where and Why: [facility] ED for uti.    “Have you seen or consulted any other health care providers outside of [facility] since your last visit?”    NO      Have you had a mammogram?”   NO    No breast cancer screening on file

## 2024-10-25 NOTE — PROGRESS NOTES
[facility]      MR#: [id number]    HISTORY OF PRESENT ILLNESS  History of Present Illness  The patient is a 39-year-old female who presents acutely with concerns regarding diabetes.    She reports frequent urination and struggles with dehydration, even though she does not feel excessively thirsty. She has been attempting to follow an anti-inflammatory diet and is seeking assistance with weight loss. She is open to consulting with a weight loss specialist.    She also has polycystic ovary syndrome, which causes her pain and fatigue. This pain is causing significant distress and is impacting her ability to work. Her mobility is limited due to pain when walking.    She has been undergoing physical therapy for several months, but it has not been beneficial. Her therapist suspects that her other health conditions may be interfering with the therapy's effectiveness.    She has not received any iron infusions and has not yet consulted with an obstetrician. She is not currently taking vitamin D supplements, although she did take them during her pregnancy.    Past medical history:  Hypertension?  Prediabetes   PCOS  Chronic lower back pain with sciatica  Iron deficiency anemia  Heavy menstrual cycle     Social:  Tobacco use: Never  Alcohol use:  Denies  Illicit drug use: Denies  Housing: Lives in [place]  Employment: [employer]     Health maintenance:  Colon cancer screening: At 45  Breast cancer screening: Due, ordered  Cervical cancer screening: NILM in 2023  COVID: Due   Influenza: Due   PCV: At 65  Shingles: At 48      Current Outpatient Medications:     acetaminophen (TYLENOL) 500 MG tablet, Take 2 tablets by mouth in the morning and at bedtime, Disp: 120 tablet, Rfl: 0    ferrous sulfate (IRON 325) 325 (65 Fe) MG tablet, Take 1 tablet by mouth daily (with breakfast), Disp: 90 tablet, Rfl: 1    naproxen (NAPROSYN) 500 MG tablet, Take 1 tablet by mouth as needed for Pain, Disp: , Rfl:     iron

## 2024-10-25 NOTE — PROGRESS NOTES
PHYSICAL / OCCUPATIONAL THERAPY - DAILY TREATMENT NOTE (updated )    Patient Name: Bhavik Kumar    Date: 10/25/2024    : 1985  Insurance: Payor: [facility] MEDICAID / Plan: [facility] CARDINAL CARE / Product Type: *No Product type* /      Patient  verified yes     Visit #   Current / Total 17 21   Time   In / Out 1025 1110   Pain   In / Out 6/10 6/10   Subjective Functional Status/Changes: Patient reports she took naproxen post last session.  Says that pain has calmed down a little.     TREATMENT AREA =  Lumbago with sciatica, left side [M54.42]  Chronic low back pain [M54.50, G89.29]    OBJECTIVE  Modalities Rationale:     decrease pain to improve patient's ability to progress to PLOF and address remaining functional goals.     min [] Estim Unattended, type/location:                                      []  w/ice    []  w/heat    min [] Estim Attended, type/location:                                     []  w/US     []  w/ice    []  w/heat    []  TENS insruct      min []  Mechanical Traction: type/lbs                   []  pro   []  sup   []  int   []  cont    []  before manual    []  after manual    min []  Ultrasound, settings/location:      min  unbill [x]  Ice     []  Heat    location/position: L draped over hip and back- s/l    min []  Paraffin,  details:     min []  Vasopneumatic Device, press/temp:     min []  Whirlpool / Fluido:    If using vaso (only need to measure limb vaso being performed on)      pre-treatment girth :       post-treatment girth :       measured at (landmark location) :      min []  Other:    Skin assessment post-treatment:   Intact       Therapeutic Procedures:  Tx Min Billable or 1:1 Min (if diff from Tx Min) Procedure, Rationale, Specifics   10  52438 Therapeutic Exercise (timed):  increase ROM, strength, coordination, balance, and proprioception to improve patient's ability to progress to PLOF and address remaining functional goals.  (see flow sheet as

## 2024-10-29 ENCOUNTER — HOSPITAL ENCOUNTER (OUTPATIENT)
Facility: HOSPITAL | Age: 39
Setting detail: SPECIMEN
Discharge: HOME | End: 2024-11-01

## 2024-10-29 LAB
ESTIMATED AVERAGE GLUCOSE: 119 MG/DL (ref 91–123)
HBA1C MFR BLD: 5.8 % (ref 4.8–5.6)
SENTARA SPECIMEN COLLECTION: NORMAL

## 2024-10-29 PROCEDURE — 99001 SPECIMEN HANDLING PT-LAB: CPT

## 2024-10-30 ENCOUNTER — TELEPHONE (OUTPATIENT)
Facility: HOSPITAL | Age: 39
End: 2024-10-30

## 2024-11-01 ENCOUNTER — TELEPHONE (OUTPATIENT)
Facility: HOSPITAL | Age: 39
End: 2024-11-01

## 2024-11-01 NOTE — TELEPHONE ENCOUNTER
LM with patient for 2nd NS/NC.  Informed patient of next appt, number to clinic, and of the no show/cancellation policy.

## 2025-01-29 ENCOUNTER — OFFICE VISIT (OUTPATIENT)
Dept: FAMILY MEDICINE CLINIC | Facility: CLINIC | Age: 40
End: 2025-01-29

## 2025-01-29 VITALS
DIASTOLIC BLOOD PRESSURE: 102 MMHG | RESPIRATION RATE: 14 BRPM | SYSTOLIC BLOOD PRESSURE: 142 MMHG | TEMPERATURE: 98 F | WEIGHT: 293 LBS | OXYGEN SATURATION: 99 % | HEART RATE: 83 BPM | HEIGHT: 67 IN | BODY MASS INDEX: 45.99 KG/M2

## 2025-01-29 DIAGNOSIS — R03.0 ELEVATED BLOOD-PRESSURE READING WITHOUT DIAGNOSIS OF HYPERTENSION: ICD-10-CM

## 2025-01-29 DIAGNOSIS — G89.29 CHRONIC LEFT-SIDED LOW BACK PAIN WITH LEFT-SIDED SCIATICA: ICD-10-CM

## 2025-01-29 DIAGNOSIS — M79.605 LEFT LEG PAIN: ICD-10-CM

## 2025-01-29 DIAGNOSIS — N30.00 ACUTE CYSTITIS WITHOUT HEMATURIA: Primary | ICD-10-CM

## 2025-01-29 DIAGNOSIS — M54.42 CHRONIC LEFT-SIDED LOW BACK PAIN WITH LEFT-SIDED SCIATICA: ICD-10-CM

## 2025-01-29 LAB
BILIRUBIN, URINE, POC: NEGATIVE
BLOOD URINE, POC: NEGATIVE
GLUCOSE URINE, POC: NEGATIVE
KETONES, URINE, POC: NEGATIVE
LEUKOCYTE ESTERASE, URINE, POC: ABNORMAL
NITRITE, URINE, POC: NEGATIVE
PH, URINE, POC: 6 (ref 4.6–8)
PROTEIN,URINE, POC: ABNORMAL
SPECIFIC GRAVITY, URINE, POC: 1.03 (ref 1–1.03)
URINALYSIS CLARITY, POC: ABNORMAL
URINALYSIS COLOR, POC: YELLOW
UROBILINOGEN, POC: ABNORMAL

## 2025-01-29 RX ORDER — AMITRIPTYLINE HYDROCHLORIDE 10 MG/1
10 TABLET ORAL NIGHTLY
Qty: 90 TABLET | Refills: 0 | Status: SHIPPED | OUTPATIENT
Start: 2025-01-29

## 2025-01-29 RX ORDER — SULFAMETHOXAZOLE AND TRIMETHOPRIM 800; 160 MG/1; MG/1
1 TABLET ORAL 2 TIMES DAILY
Qty: 6 TABLET | Refills: 0 | Status: SHIPPED | OUTPATIENT
Start: 2025-01-29 | End: 2025-02-01

## 2025-01-29 SDOH — ECONOMIC STABILITY: FOOD INSECURITY: WITHIN THE PAST 12 MONTHS, YOU WORRIED THAT YOUR FOOD WOULD RUN OUT BEFORE YOU GOT MONEY TO BUY MORE.: NEVER TRUE

## 2025-01-29 SDOH — ECONOMIC STABILITY: FOOD INSECURITY: WITHIN THE PAST 12 MONTHS, THE FOOD YOU BOUGHT JUST DIDN'T LAST AND YOU DIDN'T HAVE MONEY TO GET MORE.: NEVER TRUE

## 2025-01-29 ASSESSMENT — PATIENT HEALTH QUESTIONNAIRE - PHQ9
2. FEELING DOWN, DEPRESSED OR HOPELESS: NOT AT ALL
SUM OF ALL RESPONSES TO PHQ QUESTIONS 1-9: 0
SUM OF ALL RESPONSES TO PHQ QUESTIONS 1-9: 0
SUM OF ALL RESPONSES TO PHQ9 QUESTIONS 1 & 2: 0
SUM OF ALL RESPONSES TO PHQ QUESTIONS 1-9: 0
SUM OF ALL RESPONSES TO PHQ QUESTIONS 1-9: 0
1. LITTLE INTEREST OR PLEASURE IN DOING THINGS: NOT AT ALL

## 2025-01-29 ASSESSMENT — ENCOUNTER SYMPTOMS
SHORTNESS OF BREATH: 0
SORE THROAT: 0
DIARRHEA: 0
RHINORRHEA: 0
CONSTIPATION: 0
VOMITING: 0
NAUSEA: 0
EYE PAIN: 0
COUGH: 0
ABDOMINAL PAIN: 0
BACK PAIN: 1
CHEST TIGHTNESS: 0

## 2025-01-29 NOTE — PATIENT INSTRUCTIONS
[facility] Utility - Financial Resources*  (Call [employer] Way/211 if need more resources.)    Utilities  Common[employer]  What they offer: Partnership with the [facility] of . Assist with finding and applying for government funded programs and benefits. You can also update your benefits or report changes through [employer].  Website: https://www.[employer]/  Phone Number: 833-5CALLVA (543-487-5292)    [employer] [place] [employer]  What they offer: EnergyShare is [facility]'s energy assistance program of last resort for  anyone who faces financial hardships from unemployment or family crisis.  Phone Number: 354.131.7325  Address: [address] [place] [address]  Website: https://www.[employer]/[place]/billing/billing-options/energyshare    Energy Assistance Programs (EAP) - Department of   What they offer: EAP assists low income households in meeting their immediate home energy needs.  Phone Number: 864.298.5460 or contact your local department of   Website: https://www.dss.[place].gov/benefit/ea/    [facility]  What they offer: Bill and utility assistance  Phone number: 470.223.8072.  Website: https://[employer]    [employer]  What they offer: Utility, rent, mortgage assistance   Phone number: 634.210.3676.  Website: https://[employer].org  Regional Housing Crisis Hotline  Can assist with rent assistance, eviction prevention and utility bills.  Phone Number: 870.447.2092    Medical  [employer] Financial Assistance  What they offer: The [employer] Financial Assistance Program helps uninsured patients who do not qualify for government-sponsored health insurance and cannot afford to pay for their medical care. Insured patient may also qualify for assistance based on family income, family size, and medical needs.  Phone Number: [phone]  How to apply for the [employer] Financial Assistance

## 2025-01-30 LAB — URINE CULTURE RESULT: NORMAL
